# Patient Record
Sex: MALE | Race: WHITE | NOT HISPANIC OR LATINO | Employment: PART TIME | URBAN - NONMETROPOLITAN AREA
[De-identification: names, ages, dates, MRNs, and addresses within clinical notes are randomized per-mention and may not be internally consistent; named-entity substitution may affect disease eponyms.]

---

## 2021-06-03 DIAGNOSIS — M25.562 LEFT KNEE PAIN, UNSPECIFIED CHRONICITY: Primary | ICD-10-CM

## 2021-06-07 ENCOUNTER — OFFICE VISIT (OUTPATIENT)
Dept: ORTHOPEDIC SURGERY | Facility: CLINIC | Age: 26
End: 2021-06-07

## 2021-06-07 ENCOUNTER — HOSPITAL ENCOUNTER (OUTPATIENT)
Dept: GENERAL RADIOLOGY | Facility: HOSPITAL | Age: 26
Discharge: HOME | End: 2021-06-07
Admitting: PHYSICIAN ASSISTANT

## 2021-06-07 VITALS
SYSTOLIC BLOOD PRESSURE: 143 MMHG | BODY MASS INDEX: 38.36 KG/M2 | HEART RATE: 74 BPM | HEIGHT: 76 IN | WEIGHT: 315 LBS | DIASTOLIC BLOOD PRESSURE: 60 MMHG | TEMPERATURE: 97.1 F

## 2021-06-07 DIAGNOSIS — M25.562 ACUTE PAIN OF LEFT KNEE: Primary | ICD-10-CM

## 2021-06-07 DIAGNOSIS — M25.562 LEFT KNEE PAIN, UNSPECIFIED CHRONICITY: ICD-10-CM

## 2021-06-07 PROCEDURE — 73562 X-RAY EXAM OF KNEE 3: CPT | Performed by: RADIOLOGY

## 2021-06-07 PROCEDURE — 73562 X-RAY EXAM OF KNEE 3: CPT

## 2021-06-07 PROCEDURE — 99203 OFFICE O/P NEW LOW 30 MIN: CPT | Performed by: PHYSICIAN ASSISTANT

## 2021-06-07 RX ORDER — NAPROXEN 500 MG/1
500 TABLET ORAL 2 TIMES DAILY
Qty: 180 TABLET | Refills: 3 | Status: SHIPPED | OUTPATIENT
Start: 2021-06-07

## 2021-06-09 NOTE — PROGRESS NOTES
[facility] [place] [facility] New Patient Visit          Patient: Trent Schroeder  YOB: 1995  Date of Encounter: 06/07/2021  PCP: Provider, No Known      Subjective     Chief Complaint   Patient presents with   • Left Knee - Initial Evaluation, Pain           History of Present Illness:     Trent Schroeder is a 25 y.o. male presents today secondary to a recent fall injury suffered to the left knee anteriorly.  Patient reports that he was walking across a Walk when he fell twisting the knee landing on the anterior knee.  Patient complains of medial knee pain and occasional posterior knee pain that has been improving.  He complains of occasional giving out.  Patient describes a remote history of ACL injury with a arthroscopy and partial meniscectomy completed by Dr. Calvert in 2013.  Patient reports that he has been seeing some improvement with only occasional pain.  Denies any paresthesias.        There is no problem list on file for this patient.    History reviewed. No pertinent past medical history.  Past Surgical History:   Procedure Laterality Date   • KNEE SURGERY  2013    acl repair and meniscectomy     Social History     Occupational History   • Not on file   Tobacco Use   • Smoking status: Never Smoker   • Smokeless tobacco: Current User     Types: Snuff   Vaping Use   • Vaping Use: Every day   Substance and Sexual Activity   • Alcohol use: Yes     Comment: occasionally   • Drug use: Yes     Types: Marijuana   • Sexual activity: Defer    Trent Schroeder  reports that he has never smoked. His smokeless tobacco use includes snuff.. I have educated him on the risk of diseases from using tobacco products such as cancer, COPD and heart disease.     Social History     Social History Narrative   • Not on file     Family History   Problem Relation Age of Onset   • Cancer Mother    • Diabetes Paternal Grandmother    • Dementia Paternal Grandmother      Current Outpatient Medications   Medication Sig  "Dispense Refill   • naproxen (NAPROSYN) 500 MG tablet Take 1 tablet by mouth 2 (Two) Times a Day. 180 tablet 3     No current facility-administered medications for this visit.     No Known Allergies         Review of Systems   Constitutional: Negative.   HENT: Negative.    Eyes: Negative.    Cardiovascular: Negative.    Respiratory: Negative.    Endocrine: Negative.    Hematologic/Lymphatic: Negative.    Skin: Negative.    Musculoskeletal:        Pertinent positives listed in HPI   Gastrointestinal: Negative.    Genitourinary: Negative.    Neurological: Negative.    Psychiatric/Behavioral: Negative.    Allergic/Immunologic: Negative.          Objective      Vitals:    06/07/21 0937   BP: 143/60   Pulse: 74   Temp: 97.1 °F (36.2 °C)   Weight: (!) 147 kg (324 lb 11.2 oz)   Height: 191.8 cm (75.5\")      Patient's Body mass index is 40.05 kg/m². indicating that he is morbidly obese (BMI > 40 or > 35 with obesity - related health condition)We discussed portion control and increasing exercise..      Physical Exam  Vitals and nursing note reviewed.   Constitutional:       General: He is not in acute distress.     Appearance: Normal appearance. He is not ill-appearing.   HENT:      Head: Normocephalic and atraumatic.      Right Ear: External ear normal.      Left Ear: External ear normal.      Nose: Nose normal.      Mouth/Throat:      Mouth: Mucous membranes are moist.      Pharynx: Oropharynx is clear.   Eyes:      Extraocular Movements: Extraocular movements intact.      Conjunctiva/sclera: Conjunctivae normal.      Pupils: Pupils are equal, round, and reactive to light.   Cardiovascular:      Rate and Rhythm: Normal rate.      Pulses: Normal pulses.   Pulmonary:      Effort: Pulmonary effort is normal.   Abdominal:      General: There is no distension.   Musculoskeletal:      Cervical back: Normal range of motion. No rigidity.      Comments: Left knee examination today reveals anterior medial knee pain with medial " retinacular tenderness.  Joint line slightly tender medially.  Patient exhibits full flexion extension with no instability or pain upon varus/valgus stress.  Cruciate and collateral ligaments intact.  Patient has scant effusion.  Neurovascular is grossly intact left lower extremity.   Skin:     General: Skin is warm and dry.      Capillary Refill: Capillary refill takes less than 2 seconds.   Neurological:      General: No focal deficit present.      Mental Status: He is alert and oriented to person, place, and time.      Cranial Nerves: Cranial nerves are intact.   Psychiatric:         Mood and Affect: Mood normal.         Behavior: Behavior normal.                 Radiology:      XR Knee 3 View Left    Result Date: 6/7/2021  1.  Changes of previous ACL graft repair. 2.  No acute findings identified.  This report was finalized on 6/7/2021 10:01 AM by Dr. Gutierrez Shi MD.            Assessment/Plan        ICD-10-CM ICD-9-CM   1. Acute pain of left knee  M25.562 719.46     25-year-old male with recent injury with concern for potential complication to previously surgically fixated ACL and lateral meniscus.  Radiographs as well as the findings there is minimal concern for rupture or complication of the ACL.  Patient has good stability.  He will implement Naprosyn 5 mg 1 p.o. twice daily over the course the next several weeks with slow progression into his activity.  Patient return back in 3 weeks for further evaluation in which we will discuss potential for formal patient physical therapy versus diagnostic imaging.                      This document was signed by Juan C Dash PA-C June 9, 2021     CC: Provider, No Known       EMR Dragon/Transcription disclaimer:  Part of this note may be completed utilizing the dragon speech recognition software. This electronic transcription/translation of spoken language to printed text may contain grammatical errors, random word insertions, pronoun errors, and incomplete  sentences or occasional consequences of the system due to software limitations, ambient noise, and hardware issues.  Any questions or concerns about the content, text, or information contained within the body of this dictation should be directly addressed to the physician for clarification.

## 2021-06-28 ENCOUNTER — OFFICE VISIT (OUTPATIENT)
Dept: ORTHOPEDIC SURGERY | Facility: CLINIC | Age: 26
End: 2021-06-28

## 2021-06-28 VITALS — BODY MASS INDEX: 38.36 KG/M2 | HEIGHT: 76 IN | WEIGHT: 315 LBS

## 2021-06-28 DIAGNOSIS — M25.562 LEFT KNEE PAIN, UNSPECIFIED CHRONICITY: Primary | ICD-10-CM

## 2021-06-28 PROCEDURE — 99213 OFFICE O/P EST LOW 20 MIN: CPT | Performed by: PHYSICIAN ASSISTANT

## 2021-06-28 NOTE — PROGRESS NOTES
[facility] [place] [place]          Patient: Trent Schroeder  YOB: 1995  Date of Encounter: 06/28/2021  PCP: Provider, No Known      Subjective     Chief Complaint   Patient presents with   • Left Knee - Pain, Follow-up           History of Present Illness:     Trent Schroeder is a 25-year-old male presents today status post subacute fall injury which patient suffered anterior medial knee pain.  Patient had minimal findings for concern of disruption of the previously surgically fixated ACL and lateral segment.  Patient has progressed with the conservative treatment and anti-inflammatory medication.  Patient reports no significant instability however he still has pain as well as ambulation repetitively.  No new complaints.  Denies any paresthesias      There is no problem list on file for this patient.    History reviewed. No pertinent past medical history.  Past Surgical History:   Procedure Laterality Date   • KNEE SURGERY  2013    acl repair and meniscectomy     Social History     Occupational History   • Not on file   Tobacco Use   • Smoking status: Never Smoker   • Smokeless tobacco: Current User     Types: Snuff   Vaping Use   • Vaping Use: Every day   Substance and Sexual Activity   • Alcohol use: Yes     Comment: occasionally   • Drug use: Yes     Types: Marijuana   • Sexual activity: Defer    Trent Schroeder  reports that he has never smoked. His smokeless tobacco use includes snuff.. I have educated him on the risk of diseases from using tobacco products such as cancer, COPD and heart disease.     Social History     Social History Narrative   • Not on file     Family History   Problem Relation Age of Onset   • Cancer Mother    • Diabetes Paternal Grandmother    • Dementia Paternal Grandmother      Current Outpatient Medications   Medication Sig Dispense Refill   • naproxen (NAPROSYN) 500 MG tablet Take 1 tablet by mouth 2 (Two) Times a Day. 180 tablet 3     No current  "facility-administered medications for this visit.     No Known Allergies         Review of Systems   Constitutional: Negative.   HENT: Negative.    Eyes: Negative.    Cardiovascular: Negative.    Respiratory: Negative.    Endocrine: Negative.    Hematologic/Lymphatic: Negative.    Skin: Negative.    Musculoskeletal:        Pertinent positives listed in HPI   Gastrointestinal: Negative.    Genitourinary: Negative.    Neurological: Negative.    Psychiatric/Behavioral: Negative.    Allergic/Immunologic: Negative.          Objective      Vitals:    06/28/21 1350   Weight: (!) 147 kg (324 lb 1.2 oz)   Height: 191.8 cm (75.51\")      Patient's Body mass index is 39.96 kg/m². indicating that he is morbidly obese (BMI > 40 or > 35 with obesity - related health condition)We discussed portion control and increasing exercise..      Physical Exam  Vitals and nursing note reviewed.   Constitutional:       General: He is not in acute distress.     Appearance: Normal appearance. He is not ill-appearing.   HENT:      Head: Normocephalic and atraumatic.      Right Ear: External ear normal.      Left Ear: External ear normal.      Nose: Nose normal.      Mouth/Throat:      Mouth: Mucous membranes are moist.      Pharynx: Oropharynx is clear.   Eyes:      Extraocular Movements: Extraocular movements intact.      Conjunctiva/sclera: Conjunctivae normal.      Pupils: Pupils are equal, round, and reactive to light.   Cardiovascular:      Rate and Rhythm: Normal rate.      Pulses: Normal pulses.   Pulmonary:      Effort: Pulmonary effort is normal.   Abdominal:      General: There is no distension.   Musculoskeletal:      Cervical back: Normal range of motion. No rigidity.      Comments: Left knee examination today reveals anterior medial knee pain with medial retinacular tenderness.  Joint line slightly tender medially.  Patient exhibits full flexion extension with no instability or pain upon varus/valgus stress.  Cruciate and collateral " ligaments intact.  Patient has scant effusion.  Neurovascular is grossly intact left lower extremity.   Skin:     General: Skin is warm and dry.      Capillary Refill: Capillary refill takes less than 2 seconds.   Neurological:      General: No focal deficit present.      Mental Status: He is alert and oriented to person, place, and time.      Cranial Nerves: Cranial nerves are intact.   Psychiatric:         Mood and Affect: Mood normal.         Behavior: Behavior normal.             Radiology:      XR Knee 3 View Left    Result Date: 6/7/2021  1.  Changes of previous ACL graft repair. 2.  No acute findings identified.  This report was finalized on 6/7/2021 10:01 AM by Dr. Gutierrez Shi MD.            Assessment/Plan        ICD-10-CM ICD-9-CM   1. Left knee pain, unspecified chronicity  M25.562 719.46        25-year-old male with recent exacerbation and pain left knee with initial concern for complication of previously surgically fixated ACL and lateral viscus.  Patient has implemented the anti-inflammatory medication and activity modification.  Patient has intact integrity stressing the ACL.  As result of this patient with full outpatient physical therapy to reduce pain symptoms and decreased swelling.  Pain as well as other modalities as deemed appropriate for therapy.  Patient continue NSAIDs and return back in 3 weeks for evaluation of efficacy of conservative treatment.                This document was signed by Juan C Dash PA-C June 28, 2021     CC: Provider, No Known       EMR Dragon/Transcription disclaimer:  Part of this note may be completed utilizing the dragon speech recognition software. This electronic transcription/translation of spoken language to printed text may contain grammatical errors, random word insertions, pronoun errors, and incomplete sentences or occasional consequences of the system due to software limitations, ambient noise, and hardware issues.  Any questions or concerns about the  content, text, or information contained within the body of this dictation should be directly addressed to the physician for clarification.

## 2022-02-02 ENCOUNTER — HOSPITAL ENCOUNTER (OUTPATIENT)
Dept: GENERAL RADIOLOGY | Facility: HOSPITAL | Age: 27
Discharge: HOME | End: 2022-02-02

## 2022-02-02 DIAGNOSIS — S86.912A KNEE STRAIN, LEFT, INITIAL ENCOUNTER: ICD-10-CM

## 2022-02-02 DIAGNOSIS — S86.912A KNEE STRAIN, LEFT, INITIAL ENCOUNTER: Primary | ICD-10-CM

## 2022-02-02 PROCEDURE — 73564 X-RAY EXAM KNEE 4 OR MORE: CPT

## 2022-02-02 PROCEDURE — 73564 X-RAY EXAM KNEE 4 OR MORE: CPT | Performed by: RADIOLOGY

## 2024-05-15 ENCOUNTER — HOSPITAL ENCOUNTER (OUTPATIENT)
Dept: GENERAL RADIOLOGY | Facility: HOSPITAL | Age: 29
Discharge: HOME | End: 2024-05-15
Admitting: PHYSICIAN ASSISTANT
Payer: MEDICAID

## 2024-05-15 ENCOUNTER — OFFICE VISIT (OUTPATIENT)
Dept: ORTHOPEDIC SURGERY | Facility: CLINIC | Age: 29
End: 2024-05-15
Payer: MEDICAID

## 2024-05-15 VITALS — HEIGHT: 76 IN | BODY MASS INDEX: 38.36 KG/M2 | WEIGHT: 315 LBS

## 2024-05-15 DIAGNOSIS — M25.562 LEFT KNEE PAIN, UNSPECIFIED CHRONICITY: Primary | ICD-10-CM

## 2024-05-15 PROCEDURE — 73562 X-RAY EXAM OF KNEE 3: CPT

## 2024-05-15 PROCEDURE — 99213 OFFICE O/P EST LOW 20 MIN: CPT | Performed by: PHYSICIAN ASSISTANT

## 2024-05-15 NOTE — LETTER
May 15, 2024     Patient: Trent Schroeder   YOB: 1995   Date of Visit: 5/15/2024       To Whom It May Concern:    It is my medical opinion that Trent Schroeder may return to full duty immediately with no restrictions.           Sincerely,        Juan C Dash PA-C

## 2024-05-30 ENCOUNTER — OFFICE VISIT (OUTPATIENT)
Dept: ORTHOPEDIC SURGERY | Facility: CLINIC | Age: 29
End: 2024-05-30
Payer: MEDICAID

## 2024-05-30 VITALS — WEIGHT: 315 LBS | BODY MASS INDEX: 38.36 KG/M2 | HEIGHT: 76 IN

## 2024-05-30 DIAGNOSIS — M25.562 LEFT KNEE PAIN, UNSPECIFIED CHRONICITY: Primary | ICD-10-CM

## 2024-05-30 PROCEDURE — 99213 OFFICE O/P EST LOW 20 MIN: CPT | Performed by: PHYSICIAN ASSISTANT

## 2024-06-02 NOTE — PROGRESS NOTES
[facility] [place]          Patient: Trent Schroeder  YOB: 1995  Date of Encounter: 5/15/2024  PCP: Provider, No Known      Subjective     Chief Complaint   Patient presents with    Left Knee - Follow-up, Pain           History of Present Illness:     Trent Schroeder is a 25-year-old male presents today for an acute exacerbation left knee pain.  The patient states that a couple weeks ago he fell off his front porch and twisted his knee.  Additionally patient has 4 out of 5 knee pain with now 0 out of 10 current pain.  The patient reports no significant swelling, ecchymosis or erythema.  The patient reports progressive ambulation and improving overall range of motion since the onset of the injury.       There is no problem list on file for this patient.    History reviewed. No pertinent past medical history.  Past Surgical History:   Procedure Laterality Date    KNEE SURGERY  2013    acl repair and meniscectomy     Social History     Occupational History    Not on file   Tobacco Use    Smoking status: Never    Smokeless tobacco: Current     Types: Snuff   Vaping Use    Vaping status: Every Day   Substance and Sexual Activity    Alcohol use: Yes     Comment: occasionally    Drug use: Yes     Types: Marijuana    Sexual activity: Defer    Trent Schroeder  reports that he has never smoked. His smokeless tobacco use includes snuff.. I have educated him on the risk of diseases from using tobacco products such as cancer, COPD and heart disease.     Social History     Social History Narrative    Not on file     Family History   Problem Relation Age of Onset    Cancer Mother     Diabetes Paternal Grandmother     Dementia Paternal Grandmother      Current Outpatient Medications   Medication Sig Dispense Refill    naproxen (NAPROSYN) 500 MG tablet Take 1 tablet by mouth 2 (Two) Times a Day. (Patient not taking: Reported on 5/30/2024) 180 tablet 3     No current facility-administered  "medications for this visit.     No Known Allergies         Review of Systems   Constitutional: Negative.   HENT: Negative.     Eyes: Negative.    Cardiovascular: Negative.    Respiratory: Negative.     Endocrine: Negative.    Hematologic/Lymphatic: Negative.    Skin: Negative.    Musculoskeletal:         Pertinent positives listed in HPI   Gastrointestinal: Negative.    Genitourinary: Negative.    Neurological: Negative.    Psychiatric/Behavioral: Negative.     Allergic/Immunologic: Negative.          Objective      Vitals:    05/15/24 0949   Weight: (!) 149 kg (328 lb)   Height: 193 cm (76\")   PainSc: 0-No pain      Patient's Body mass index is 39.93 kg/m². indicating that he is morbidly obese (BMI > 40 or > 35 with obesity - related health condition)We discussed portion control and increasing exercise..      Physical Exam  Vitals and nursing note reviewed.   Constitutional:       General: He is not in acute distress.     Appearance: Normal appearance. He is not ill-appearing.   HENT:      Head: Normocephalic and atraumatic.      Right Ear: External ear normal.      Left Ear: External ear normal.      Nose: Nose normal.      Mouth/Throat:      Mouth: Mucous membranes are moist.      Pharynx: Oropharynx is clear.   Eyes:      Extraocular Movements: Extraocular movements intact.      Conjunctiva/sclera: Conjunctivae normal.      Pupils: Pupils are equal, round, and reactive to light.   Cardiovascular:      Rate and Rhythm: Normal rate.      Pulses: Normal pulses.   Pulmonary:      Effort: Pulmonary effort is normal.   Abdominal:      General: There is no distension.   Musculoskeletal:      Cervical back: Normal range of motion. No rigidity.      Comments: Left knee examination today reveals anterior medial knee pain with medial retinacular tenderness.  Joint line slightly tender medially.  Patient exhibits full flexion extension with no instability or pain upon varus/valgus stress.  Cruciate and collateral ligaments " intact.  Patient has scant effusion.  Neurovascular is grossly intact left lower extremity.   Skin:     General: Skin is warm and dry.      Capillary Refill: Capillary refill takes less than 2 seconds.   Neurological:      General: No focal deficit present.      Mental Status: He is alert and oriented to person, place, and time.   Psychiatric:         Mood and Affect: Mood normal.         Behavior: Behavior normal.             Radiology:      XR Knee 3 View Left    Result Date: 5/15/2024    No acute findings in the left knee.   This report was finalized on 5/15/2024 9:59 AM by Dr. Amanuel Lawson MD.             Assessment/Plan        ICD-10-CM ICD-9-CM   1. Left knee pain, unspecified chronicity  M25.562 719.46      25-year-old male with recent exacerbation of pain and symptoms left knee.  Patient had previously surgically fixated ACL and lateral meniscus tear.  The patient has been doing very well into the last several weeks in which she suffered a twisting injury when falling off of his porch.  Overall the patient has no significant instability and only mild medial joint line pain.  The patient will implement anti-inflammatory medication as well as bracing and will return back in 2 weeks for repeat evaluation.  We discussed potential for formal outpatient physical therapy.  Minimal concern for internal derangement.                  This document was signed by Juan C Dash PA-C May 15, 2024    CC: Provider, No Known       EMR Dragon/Transcription disclaimer:  Part of this note may be completed utilizing the dragon speech recognition software. This electronic transcription/translation of spoken language to printed text may contain grammatical errors, random word insertions, pronoun errors, and incomplete sentences or occasional consequences of the system due to software limitations, ambient noise, and hardware issues.  Any questions or concerns about the content, text, or information contained within the body of  this dictation should be directly addressed to the physician for clarification.

## 2024-06-03 NOTE — PROGRESS NOTES
[facility] [place] [facility] Established Patient Visit          Patient: Trent Schroeder  YOB: 1995  Date of Encounter: 5/30/2024  PCP: Provider, No Known      Subjective     Chief Complaint   Patient presents with    Left Knee - Follow-up, Pain           History of Present Illness:     Trent Schroeder is a 25-year-old male presents today for an acute exacerbation left knee pain.  The patient states that a couple weeks ago he fell off his front porch and twisted his knee.  Initially the patient has 4 out of 5 knee pain with now 0 out of 10 current pain.  The patient reports no significant swelling, ecchymosis or erythema.  The patient reports progressive ambulation and improving overall range of motion since the onset of the injury.       There is no problem list on file for this patient.    History reviewed. No pertinent past medical history.  Past Surgical History:   Procedure Laterality Date    KNEE SURGERY  2013    acl repair and meniscectomy     Social History     Occupational History    Not on file   Tobacco Use    Smoking status: Never    Smokeless tobacco: Current     Types: Snuff   Vaping Use    Vaping status: Every Day   Substance and Sexual Activity    Alcohol use: Yes     Comment: occasionally    Drug use: Yes     Types: Marijuana    Sexual activity: Defer    Trent Schroeder  reports that he has never smoked. His smokeless tobacco use includes snuff.. I have educated him on the risk of diseases from using tobacco products such as cancer, COPD and heart disease.     Social History     Social History Narrative    Not on file     Family History   Problem Relation Age of Onset    Cancer Mother     Diabetes Paternal Grandmother     Dementia Paternal Grandmother      Current Outpatient Medications   Medication Sig Dispense Refill    naproxen (NAPROSYN) 500 MG tablet Take 1 tablet by mouth 2 (Two) Times a Day. (Patient not taking: Reported on 5/30/2024) 180 tablet 3     No current facility-administered  "medications for this visit.     No Known Allergies         Review of Systems   Constitutional: Negative.   HENT: Negative.     Eyes: Negative.    Cardiovascular: Negative.    Respiratory: Negative.     Endocrine: Negative.    Hematologic/Lymphatic: Negative.    Skin: Negative.    Musculoskeletal:         Pertinent positives listed in HPI   Gastrointestinal: Negative.    Genitourinary: Negative.    Neurological: Negative.    Psychiatric/Behavioral: Negative.     Allergic/Immunologic: Negative.          Objective      Vitals:    05/30/24 0937   Weight: (!) 149 kg (328 lb)   Height: 193 cm (76\")      Patient's Body mass index is 39.93 kg/m². indicating that he is morbidly obese (BMI > 40 or > 35 with obesity - related health condition)We discussed portion control and increasing exercise..      Physical Exam  Vitals and nursing note reviewed.   Constitutional:       General: He is not in acute distress.     Appearance: Normal appearance. He is not ill-appearing.   HENT:      Head: Normocephalic and atraumatic.      Right Ear: External ear normal.      Left Ear: External ear normal.      Nose: Nose normal.      Mouth/Throat:      Mouth: Mucous membranes are moist.      Pharynx: Oropharynx is clear.   Eyes:      Extraocular Movements: Extraocular movements intact.      Conjunctiva/sclera: Conjunctivae normal.      Pupils: Pupils are equal, round, and reactive to light.   Cardiovascular:      Rate and Rhythm: Normal rate.      Pulses: Normal pulses.   Pulmonary:      Effort: Pulmonary effort is normal.   Abdominal:      General: There is no distension.   Musculoskeletal:      Cervical back: Normal range of motion. No rigidity.      Comments: Left knee examination today reveals reduction of previous medial knee pain with resolution medial retinacular tenderness.  Joint line slightly tender medially.  Patient exhibits full flexion extension with no instability or pain upon varus/valgus stress.  Cruciate and collateral " ligaments intact.  Patient has scant effusion.  Neurovascular is grossly intact left lower extremity.   Skin:     General: Skin is warm and dry.      Capillary Refill: Capillary refill takes less than 2 seconds.   Neurological:      General: No focal deficit present.      Mental Status: He is alert and oriented to person, place, and time.   Psychiatric:         Mood and Affect: Mood normal.         Behavior: Behavior normal.             Radiology:      XR Knee 3 View Left    Result Date: 5/15/2024    No acute findings in the left knee.   This report was finalized on 5/15/2024 9:59 AM by Dr. Amanuel Lawson MD.             Assessment/Plan        ICD-10-CM ICD-9-CM   1. Left knee pain, unspecified chronicity  M25.562 719.46        25-year-old male with recent exacerbation of pain and symptoms left knee.  Patient had previously surgically fixated ACL and lateral meniscus tear.  The patient has been doing very well into the last several weeks in which she suffered a twisting injury when falling off of his porch.  Overall the patient has no significant instability and notable reduction of pain and symptoms since last office visit.  He has discontinued anti-inflammatory medication and reports no complications.  As result of this the patient will return back on an as-needed basis as pain and swelling are his guide.  Minimal concern for internal derangement.  Follow-up when necessary.               This document was signed by Juan C Dash PA-C May 30, 2024    CC: Provider, No Known       EMR Dragon/Transcription disclaimer:  Part of this note may be completed utilizing the dragon speech recognition software. This electronic transcription/translation of spoken language to printed text may contain grammatical errors, random word insertions, pronoun errors, and incomplete sentences or occasional consequences of the system due to software limitations, ambient noise, and hardware issues.  Any questions or concerns about the  content, text, or information contained within the body of this dictation should be directly addressed to the physician for clarification.